# Patient Record
Sex: FEMALE | Race: WHITE | URBAN - METROPOLITAN AREA
[De-identification: names, ages, dates, MRNs, and addresses within clinical notes are randomized per-mention and may not be internally consistent; named-entity substitution may affect disease eponyms.]

---

## 2017-05-27 ENCOUNTER — OFFICE VISIT (OUTPATIENT)
Dept: URGENT CARE | Facility: URGENT CARE | Age: 27
End: 2017-05-27
Payer: COMMERCIAL

## 2017-05-27 VITALS
BODY MASS INDEX: 21.99 KG/M2 | HEART RATE: 100 BPM | TEMPERATURE: 96.9 F | HEIGHT: 65 IN | OXYGEN SATURATION: 98 % | DIASTOLIC BLOOD PRESSURE: 96 MMHG | WEIGHT: 132 LBS | SYSTOLIC BLOOD PRESSURE: 133 MMHG

## 2017-05-27 DIAGNOSIS — R30.0 DYSURIA: Primary | ICD-10-CM

## 2017-05-27 DIAGNOSIS — N30.00 ACUTE CYSTITIS WITHOUT HEMATURIA: ICD-10-CM

## 2017-05-27 LAB
ALBUMIN UR-MCNC: >=300 MG/DL
APPEARANCE UR: CLEAR
BACTERIA #/AREA URNS HPF: ABNORMAL /HPF
BILIRUB UR QL STRIP: NEGATIVE
COLOR UR AUTO: YELLOW
GLUCOSE UR STRIP-MCNC: NEGATIVE MG/DL
HGB UR QL STRIP: ABNORMAL
KETONES UR STRIP-MCNC: NEGATIVE MG/DL
LEUKOCYTE ESTERASE UR QL STRIP: ABNORMAL
NITRATE UR QL: NEGATIVE
NON-SQ EPI CELLS #/AREA URNS LPF: ABNORMAL /LPF
PH UR STRIP: 7 PH (ref 5–7)
RBC #/AREA URNS AUTO: ABNORMAL /HPF (ref 0–2)
SP GR UR STRIP: 1.02 (ref 1–1.03)
URN SPEC COLLECT METH UR: ABNORMAL
UROBILINOGEN UR STRIP-ACNC: 0.2 EU/DL (ref 0.2–1)
WBC #/AREA URNS AUTO: ABNORMAL /HPF (ref 0–2)

## 2017-05-27 PROCEDURE — 81001 URINALYSIS AUTO W/SCOPE: CPT | Performed by: PHYSICIAN ASSISTANT

## 2017-05-27 PROCEDURE — 99203 OFFICE O/P NEW LOW 30 MIN: CPT | Performed by: PHYSICIAN ASSISTANT

## 2017-05-27 RX ORDER — NORETHINDRONE ACETATE AND ETHINYL ESTRADIOL 1MG-20(21)
1 KIT ORAL DAILY
COMMUNITY

## 2017-05-27 RX ORDER — NITROFURANTOIN 25; 75 MG/1; MG/1
100 CAPSULE ORAL 2 TIMES DAILY
Qty: 14 CAPSULE | Refills: 0 | Status: SHIPPED | OUTPATIENT
Start: 2017-05-27

## 2017-05-27 NOTE — PROGRESS NOTES
"SUBJECTIVE:   Lizzeth De La Cruz is a 26 year old female who  presents today for a possible UTI. Symptoms of dysuria, urgency, frequency, suprapubic pain and pressure and voiding in small amounts have been going on for 1day(s).  Hematuria no.  sudden onsetand moderate.  There is no history of fever, chills, nausea or vomiting.  No history of vaginal discharge. This patient does not have a history of urinary tract infections. Patient denies rigors, flank pain, temperature > 101 degrees F. and Vomiting, significant nausea or diarrhea or vaginal discharge, vaginal odor and vaginal itching     No past medical history on file.  Current Outpatient Prescriptions   Medication Sig Dispense Refill     norethindrone-ethinyl estradiol (JUNEL FE 1/20) 1-20 MG-MCG per tablet Take 1 tablet by mouth daily       fluticasone (VERAMYST) 27.5 MCG/SPRAY spray Spray 2 sprays into both nostrils daily       ACETAMINOPHEN PO        Social History   Substance Use Topics     Smoking status: Never Smoker     Smokeless tobacco: Not on file     Alcohol use Not on file       ROS:   CONSTITUTIONAL:NEGATIVE for fever, chills, change in weight  : dysuria and frequency     OBJECTIVE:  BP (!) 133/96  Pulse 100  Temp 96.9  F (36.1  C) (Tympanic)  Ht 5' 5\" (1.651 m)  Wt 132 lb (59.9 kg)  LMP 05/09/2017  SpO2 98%  BMI 21.97 kg/m2  GENERAL APPEARANCE: healthy, alert and no distress  RESP: lungs clear to auscultation - no rales, rhonchi or wheezes  CV: regular rates and rhythm, normal S1 S2, no murmur noted  ABDOMEN: tenderness mild suprapubic  BACK: No CVA tenderness  SKIN: no suspicious lesions or rashes    ASSESSMENT:     1. Dysuria    - *UA reflex to Microscopic and Culture ([place] and [facility] (except [place] and [place])  - Urine Microscopic  - nitrofurantoin, macrocrystal-monohydrate, (MACROBID) 100 MG capsule; Take 1 capsule (100 mg) by mouth 2 times daily  Dispense: 14 capsule; Refill: 0    2. Acute cystitis without " hematuria    - nitrofurantoin, macrocrystal-monohydrate, (MACROBID) 100 MG capsule; Take 1 capsule (100 mg) by mouth 2 times daily  Dispense: 14 capsule; Refill: 0    PLAN:  As per ordered above  Drink plenty of fluids.  Prevention and treatment of UTI's discussed.Signs and symptoms of pyelonephritis mentioned.  Follow up with primary care physician if not improving over the next 3 days.

## 2017-05-27 NOTE — MR AVS SNAPSHOT
"              After Visit Summary   2017    Lizzeth De La Cruz    MRN: [id number]           Patient Information     Date Of Birth          1990        Visit Information        Provider Department      2017 8:20 AM Benny Huff PA-C [facility]        Today's Diagnoses     Dysuria    -  1    Acute cystitis without hematuria           Follow-ups after your visit        Who to contact     If you have questions or need follow up information about today's clinic visit or your schedule please contact [facility] directly at [phone].  Normal or non-critical lab and imaging results will be communicated to you by [employer], letter or phone within 4 business days after the clinic has received the results. If you do not hear from us within 7 days, please contact the clinic through [employer] or phone. If you have a critical or abnormal lab result, we will notify you by phone as soon as possible.  Submit refill requests through [employer] or call your pharmacy and they will forward the refill request to us. Please allow 3 business days for your refill to be completed.          Additional Information About Your Visit        MyChart Information     [employer] lets you send messages to your doctor, view your test results, renew your prescriptions, schedule appointments and more. To sign up, go to [email or web address] . Click on \"Log in\" on the left side of the screen, which will take you to the Welcome page. Then click on \"Sign up Now\" on the right side of the page.     You will be asked to enter the access code listed below, as well as some personal information. Please follow the directions to create your username and password.     Your access code is: [email or web address]  Expires: 2017  9:04 AM     Your access code will  in 90 days. If you need help or a new code, please call your [place] clinic or [phone].        Care EveryWhere ID     This is your Care " "EveryWhere ID. This could be used by other organizations to access your [facility] medical records  [id number]        Your Vitals Were     Pulse Temperature Height Last Period Pulse Oximetry BMI (Body Mass Index)    100 96.9  F (36.1  C) (Tympanic) 5' 5\" (1.651 m) 05/09/2017 98% 21.97 kg/m2       Blood Pressure from Last 3 Encounters:   05/27/17 (!) 133/96    Weight from Last 3 Encounters:   05/27/17 132 lb (59.9 kg)              We Performed the Following     *UA reflex to Microscopic and Culture ([place] and [facility] (except [place] and [place])     Urine Microscopic          Today's Medication Changes          These changes are accurate as of: 5/27/17  9:04 AM.  If you have any questions, ask your nurse or doctor.               Start taking these medicines.        Dose/Directions    nitrofurantoin (macrocrystal-monohydrate) 100 MG capsule   Commonly known as:  MACROBID   Used for:  Dysuria, Acute cystitis without hematuria   Started by:  Benny Huff PA-C        Dose:  100 mg   Take 1 capsule (100 mg) by mouth 2 times daily   Quantity:  14 capsule   Refills:  0            Where to get your medicines      These medications were sent to [facility] - [place], [place] - [address]  [address], [place] [address]     Phone:  [phone]     nitrofurantoin (macrocrystal-monohydrate) 100 MG capsule                Primary Care Provider    None       No address on file        Thank you!     Thank you for choosing [facility]  for your care. Our goal is always to provide you with excellent care. Hearing back from our patients is one way we can continue to improve our services. Please take a few minutes to complete the written survey that you may receive in the mail after your visit with us. Thank you!             Your Updated Medication List - Protect others around you: Learn how to safely use, store and throw away your medicines at www.disposemymeds.org.    "       This list is accurate as of: 5/27/17  9:04 AM.  Always use your most recent med list.                   Brand Name Dispense Instructions for use    ACETAMINOPHEN PO          fluticasone 27.5 MCG/SPRAY spray    VERAMYST     Spray 2 sprays into both nostrils daily       nitrofurantoin (macrocrystal-monohydrate) 100 MG capsule    MACROBID    14 capsule    Take 1 capsule (100 mg) by mouth 2 times daily       norethindrone-ethinyl estradiol 1-20 MG-MCG per tablet    JUNEL FE 1/20     Take 1 tablet by mouth daily

## 2017-05-27 NOTE — NURSING NOTE
"Chief Complaint   Patient presents with     Urgent Care     Pt in clinic to have eval for dysuria and frequency.     Dysuria     Frequency       Initial BP (!) 133/96  Pulse 100  Temp 96.9  F (36.1  C) (Tympanic)  Ht 5' 5\" (1.651 m)  Wt 132 lb (59.9 kg)  LMP 05/09/2017  SpO2 98%  BMI 21.97 kg/m2 Estimated body mass index is 21.97 kg/(m^2) as calculated from the following:    Height as of this encounter: 5' 5\" (1.651 m).    Weight as of this encounter: 132 lb (59.9 kg).  Medication Reconciliation: complete   Camila Huff/ MA    "